# Patient Record
Sex: FEMALE | Race: BLACK OR AFRICAN AMERICAN | Employment: UNEMPLOYED
[De-identification: names, ages, dates, MRNs, and addresses within clinical notes are randomized per-mention and may not be internally consistent; named-entity substitution may affect disease eponyms.]

---

## 2017-07-08 ENCOUNTER — HEALTH MAINTENANCE LETTER (OUTPATIENT)
Age: 37
End: 2017-07-08

## 2018-04-12 ENCOUNTER — OFFICE VISIT - HEALTHEAST (OUTPATIENT)
Dept: INTERNAL MEDICINE | Facility: CLINIC | Age: 38
End: 2018-04-12

## 2018-04-12 DIAGNOSIS — N39.0 UTI (URINARY TRACT INFECTION): ICD-10-CM

## 2018-04-12 DIAGNOSIS — G62.9 PERIPHERAL NEUROPATHY: ICD-10-CM

## 2018-04-12 DIAGNOSIS — Z00.00 ENCOUNTER FOR GENERAL HEALTH EXAMINATION: ICD-10-CM

## 2018-04-12 DIAGNOSIS — Z21 HIV POSITIVE (H): ICD-10-CM

## 2018-04-12 DIAGNOSIS — K21.9 GERD (GASTROESOPHAGEAL REFLUX DISEASE): ICD-10-CM

## 2018-04-12 ASSESSMENT — MIFFLIN-ST. JEOR: SCORE: 1527.73

## 2018-09-07 ENCOUNTER — RECORDS - HEALTHEAST (OUTPATIENT)
Dept: ADMINISTRATIVE | Facility: OTHER | Age: 38
End: 2018-09-07

## 2021-06-01 VITALS — HEIGHT: 69 IN | BODY MASS INDEX: 25.74 KG/M2 | WEIGHT: 173.8 LBS

## 2021-06-02 ENCOUNTER — RECORDS - HEALTHEAST (OUTPATIENT)
Dept: ADMINISTRATIVE | Facility: CLINIC | Age: 41
End: 2021-06-02

## 2021-06-17 NOTE — PROGRESS NOTES
ASSESSMENT/PLAN:    1. UTI (urinary tract infection)  Recent positive culture, on keflex, but intermediate in sensitivity, will change to ciprofloxacin 500 mg po bid for 7 days.      2. HIV positive  She has ongoing rx with [facility], and will continue.     3. Peripheral neuropathy  Reported dysesthesias of the feet, told this is secondary to her HIV medication.  On gabapentin.  Will continue.     4. GERD (gastroesophageal reflux disease)  Occasional symptoms, history of H. Pylori - treated.     5. Encounter for general health examination  Physical exam reveals shotty left anterior cervical lymph node, soft and moveable. Has been there some time.  She is advised to have her HIV physician evaluate.  Follow up about this prn if increases in size of develops pain.      6. Left anterior cervical adenopathy  See above, not tender, or hard, moveable.  Likely reactive.      7. Chronic depression and anxiety  Has ongoing psychiatric care with clonazepam and abilify treatment.  Advised to continue these medications.   No clear history of opioid dependency.  Would be very reluctant to use chronic opioid therapy.  I recommended regular exercise - cross fit done gradually with yoga.      Follow up prn.     Medications Discontinued During This Encounter   Medication Reason     HYDROcodone-acetaminophen 5-325 mg per tablet Therapy completed     oxyCODONE-acetaminophen (PERCOCET) 5-325 mg per tablet Therapy completed     oxyCODONE-acetaminophen (PERCOCET) 5-325 mg per tablet Therapy completed     CHIEF COMPLAINT:  Chief Complaint   Patient presents with     Annual Exam     Follow-up     HISTORY OF PRESENT ILLNESS:  Cherry is a 37 y.o. female presenting to the clinic today for general health evaluation. She recently have UTI evaluated at [facility].  She is told the antibiotic - keflex - is only intermediate in sensitivity.  She feels some better but still dysuria.  No overt fever, or hematuria.  Vague chills but no  overt rigors or fever.  No nausea or shortness of breath.  Mother of two, disabled and unemployed.  HIV positive, chronically.  Has chronic anxiety with depression on medical treatment with long standing care and medications.  Has chronic back pain, and feet dysesthesias.  Told that the feet symptoms are related to neuropathy from HIV medications.  No unsual cough, or weight loss, or new abdominal or chest pain.      REVIEW OF SYSTEMS:   All other systems are negative.    PFSH:    History   Smoking Status     Current Every Day Smoker   Smokeless Tobacco     Never Used     Family History   Problem Relation Age of Onset     Breast cancer Mother      Colon cancer Mother      Cancer Maternal Grandmother      Lung cancer Maternal Aunt      Diabetes type II Maternal Aunt      Social History     Social History     Marital status: Single     Spouse name: N/A     Number of children: 2     Years of education: N/A     Occupational History     unemployed      social security disability      Social History Main Topics     Smoking status: Current Every Day Smoker     Smokeless tobacco: Never Used     Alcohol use No     Drug use: No     Sexual activity: Not on file     Other Topics Concern     Not on file     Social History Narrative       Past Surgical History:   Procedure Laterality Date     CHOLECYSTECTOMY       TUBAL LIGATION       Allergies   Allergen Reactions     Morphine Hives and Shortness Of Breath     Penicillins Hives     Active Ambulatory Problems     Diagnosis Date Noted     Pyelonephritis 11/19/2014     Peripheral neuropathy      HIV positive 04/12/2018     GERD (gastroesophageal reflux disease) 04/12/2018     Resolved Ambulatory Problems     Diagnosis Date Noted     No Resolved Ambulatory Problems     Past Medical History:   Diagnosis Date     Depression      Endometriosis      Helicobacter pylori (H. pylori)      HIV disease      Lower back pain      MDD (major depressive disorder)      Peripheral neuropathy       "Pyelonephritis      Tubo-ovarian abscess      UTI (lower urinary tract infection)      VITALS:  Vitals:    04/12/18 1156   BP: 120/74   Patient Site: Left Arm   Patient Position: Sitting   Cuff Size: Adult Regular   Pulse: 93   SpO2: 99%   Weight: 173 lb 12.8 oz (78.8 kg)   Height: 5' 9\" (1.753 m)     Wt Readings from Last 3 Encounters:   04/12/18 173 lb 12.8 oz (78.8 kg)   03/13/18 171 lb (77.6 kg)   06/11/16 160 lb (72.6 kg)     Body mass index is 25.67 kg/(m^2).    PHYSICAL EXAM:  Constitutional:  Looks tired.  In NAD, alert and oriented.   Ears:  Clear.  Oropharynx:   negative  Neck:  Supple, thyroid not palpable, left upper anterior lymph node is palpable, moveable, soft, and not tender.  No fluctuance, or warms, not cystic.  Cardiac:  S1 S2 without murmur  Lungs: Clear to auscultation, no wheezes or rhonchi  Abdomen:   Bowel sounds positive, nontender, nondistended, without rebound or guarding, or mass  Skin:   Without rash, bruise, or palpable lesions.  Extremities: no joint inflammation or effusions, distal pulses intact  Neurologic: motor and sensory are intact, speech is normal, gait is normal.      Psychiatric:  Mood is resigned, memory intact.     ADDITIONAL HISTORY SUMMARIZED (2): reviewed care everywhere records  DECISION TO OBTAIN EXTRA INFORMATION (1): None.   RADIOLOGY TESTS (1): None.  LABS (1): None.  MEDICINE TESTS (1): None.  INDEPENDENT REVIEW (2 each): None.     MEDICATIONS:  Current Outpatient Prescriptions   Medication Sig Dispense Refill     clonazePAM (KLONOPIN) 0.5 MG tablet Take 0.5 mg by mouth bedtime.       clonazePAM (KLONOPIN) 0.5 MG tablet Take 0.5 mg by mouth daily as needed for anxiety.       emtricitab-rilpivirine-tenofov 200- mg Tab Take 1 tablet by mouth daily.       FLUoxetine (PROZAC) 40 MG capsule Take 40 mg by mouth daily.       ibuprofen (ADVIL,MOTRIN) 600 MG tablet Take 1 tablet (600 mg total) by mouth every 6 (six) hours as needed for pain. Take with a little food " 30 tablet 0     ondansetron (ZOFRAN ODT) 4 MG disintegrating tablet Take 1 tablet (4 mg total) by mouth every 8 (eight) hours as needed for nausea (As needed for nausea). 6 tablet 0     ondansetron (ZOFRAN, AS HYDROCHLORIDE,) 4 MG tablet Take 1-2 tab po 4 times daily prn nausea, max daily dose 4 tab 20 tablet 0     ondansetron (ZOFRAN, AS HYDROCHLORIDE,) 4 MG tablet Take 1-2 tab po 4 times daily prn nausea, max daily dose 4 tab 20 tablet 0     ARIPiprazole (ABILIFY) 2.5 mg Take 5 mg by mouth daily.       atenolol (TENORMIN) 25 MG tablet Take 25 mg by mouth daily.       ciprofloxacin HCl (CIPRO) 500 MG tablet Take 1 tablet (500 mg total) by mouth 2 (two) times a day for 7 days. 14 tablet 0     TRIAMCINOLONE ACETONIDE INHL Inhale 1 puff daily.       No current facility-administered medications for this visit.      Total points:  2